# Patient Record
Sex: MALE | Race: WHITE
[De-identification: names, ages, dates, MRNs, and addresses within clinical notes are randomized per-mention and may not be internally consistent; named-entity substitution may affect disease eponyms.]

---

## 2018-02-01 ENCOUNTER — HOSPITAL ENCOUNTER (OUTPATIENT)
Dept: HOSPITAL 92 - ERS | Age: 83
Setting detail: OBSERVATION
LOS: 2 days | Discharge: HOME | End: 2018-02-03
Attending: INTERNAL MEDICINE | Admitting: INTERNAL MEDICINE
Payer: MEDICARE

## 2018-02-01 VITALS — BODY MASS INDEX: 24.1 KG/M2

## 2018-02-01 DIAGNOSIS — Z90.5: ICD-10-CM

## 2018-02-01 DIAGNOSIS — Z66: ICD-10-CM

## 2018-02-01 DIAGNOSIS — H40.9: ICD-10-CM

## 2018-02-01 DIAGNOSIS — Z86.73: ICD-10-CM

## 2018-02-01 DIAGNOSIS — D64.9: ICD-10-CM

## 2018-02-01 DIAGNOSIS — I65.29: ICD-10-CM

## 2018-02-01 DIAGNOSIS — I25.10: ICD-10-CM

## 2018-02-01 DIAGNOSIS — Z79.899: ICD-10-CM

## 2018-02-01 DIAGNOSIS — Z98.61: ICD-10-CM

## 2018-02-01 DIAGNOSIS — I24.8: ICD-10-CM

## 2018-02-01 DIAGNOSIS — Z79.02: ICD-10-CM

## 2018-02-01 DIAGNOSIS — R55: Primary | ICD-10-CM

## 2018-02-01 DIAGNOSIS — S01.01XA: ICD-10-CM

## 2018-02-01 DIAGNOSIS — M81.0: ICD-10-CM

## 2018-02-01 DIAGNOSIS — Z88.0: ICD-10-CM

## 2018-02-01 DIAGNOSIS — I10: ICD-10-CM

## 2018-02-01 DIAGNOSIS — R53.1: ICD-10-CM

## 2018-02-01 LAB
ALBUMIN SERPL BCG-MCNC: 3.8 G/DL (ref 3.4–4.8)
ALP SERPL-CCNC: 69 U/L (ref 40–150)
ALT SERPL W P-5'-P-CCNC: 22 U/L (ref 8–55)
ANION GAP SERPL CALC-SCNC: 15 MMOL/L (ref 10–20)
AST SERPL-CCNC: 32 U/L (ref 5–34)
BASOPHILS # BLD AUTO: 0 THOU/UL (ref 0–0.2)
BASOPHILS NFR BLD AUTO: 0.4 % (ref 0–1)
BILIRUB SERPL-MCNC: 1 MG/DL (ref 0.2–1.2)
BUN SERPL-MCNC: 19 MG/DL (ref 8.4–25.7)
CALCIUM SERPL-MCNC: 9.8 MG/DL (ref 7.8–10.44)
CHLORIDE SERPL-SCNC: 106 MMOL/L (ref 98–107)
CK MB SERPL-MCNC: 4.7 NG/ML (ref 0–6.6)
CO2 SERPL-SCNC: 25 MMOL/L (ref 23–31)
CREAT CL PREDICTED SERPL C-G-VRATE: 0 ML/MIN (ref 70–130)
EOSINOPHIL # BLD AUTO: 0 THOU/UL (ref 0–0.7)
EOSINOPHIL NFR BLD AUTO: 0.5 % (ref 0–10)
GLOBULIN SER CALC-MCNC: 3.1 G/DL (ref 2.4–3.5)
GLUCOSE SERPL-MCNC: 102 MG/DL (ref 83–110)
HGB BLD-MCNC: 13.6 G/DL (ref 14–18)
LYMPHOCYTES # BLD: 1.5 THOU/UL (ref 1.2–3.4)
LYMPHOCYTES NFR BLD AUTO: 20.3 % (ref 21–51)
MCH RBC QN AUTO: 34.3 PG (ref 27–31)
MCV RBC AUTO: 103 FL (ref 80–94)
MONOCYTES # BLD AUTO: 0.6 THOU/UL (ref 0.11–0.59)
MONOCYTES NFR BLD AUTO: 8.6 % (ref 0–10)
NEUTROPHILS # BLD AUTO: 5.2 THOU/UL (ref 1.4–6.5)
NEUTROPHILS NFR BLD AUTO: 70.3 % (ref 42–75)
PLATELET # BLD AUTO: 120 THOU/UL (ref 130–400)
POTASSIUM SERPL-SCNC: 4.5 MMOL/L (ref 3.5–5.1)
RBC # BLD AUTO: 3.97 MILL/UL (ref 4.7–6.1)
SODIUM SERPL-SCNC: 141 MMOL/L (ref 136–145)
SP GR UR STRIP: 1.02 (ref 1–1.04)
TROPONIN I SERPL DL<=0.01 NG/ML-MCNC: 0.17 NG/ML (ref ?–0.03)
TROPONIN I SERPL DL<=0.01 NG/ML-MCNC: 0.23 NG/ML (ref ?–0.03)
WBC # BLD AUTO: 7.3 THOU/UL (ref 4.8–10.8)

## 2018-02-01 PROCEDURE — 85025 COMPLETE CBC W/AUTO DIFF WBC: CPT

## 2018-02-01 PROCEDURE — 70498 CT ANGIOGRAPHY NECK: CPT

## 2018-02-01 PROCEDURE — 80053 COMPREHEN METABOLIC PANEL: CPT

## 2018-02-01 PROCEDURE — 99285 EMERGENCY DEPT VISIT HI MDM: CPT

## 2018-02-01 PROCEDURE — G0009 ADMIN PNEUMOCOCCAL VACCINE: HCPCS

## 2018-02-01 PROCEDURE — 81003 URINALYSIS AUTO W/O SCOPE: CPT

## 2018-02-01 PROCEDURE — 90670 PCV13 VACCINE IM: CPT

## 2018-02-01 PROCEDURE — 97139 UNLISTED THERAPEUTIC PX: CPT

## 2018-02-01 PROCEDURE — 90471 IMMUNIZATION ADMIN: CPT

## 2018-02-01 PROCEDURE — 36415 COLL VENOUS BLD VENIPUNCTURE: CPT

## 2018-02-01 PROCEDURE — 97116 GAIT TRAINING THERAPY: CPT

## 2018-02-01 PROCEDURE — 80048 BASIC METABOLIC PNL TOTAL CA: CPT

## 2018-02-01 PROCEDURE — 96360 HYDRATION IV INFUSION INIT: CPT

## 2018-02-01 PROCEDURE — 82553 CREATINE MB FRACTION: CPT

## 2018-02-01 PROCEDURE — 70551 MRI BRAIN STEM W/O DYE: CPT

## 2018-02-01 PROCEDURE — G0378 HOSPITAL OBSERVATION PER HR: HCPCS

## 2018-02-01 PROCEDURE — 93880 EXTRACRANIAL BILAT STUDY: CPT

## 2018-02-01 PROCEDURE — 93005 ELECTROCARDIOGRAM TRACING: CPT

## 2018-02-01 PROCEDURE — 70450 CT HEAD/BRAIN W/O DYE: CPT

## 2018-02-01 PROCEDURE — 84484 ASSAY OF TROPONIN QUANT: CPT

## 2018-02-01 PROCEDURE — 96361 HYDRATE IV INFUSION ADD-ON: CPT

## 2018-02-01 PROCEDURE — 93306 TTE W/DOPPLER COMPLETE: CPT

## 2018-02-01 PROCEDURE — 12002 RPR S/N/AX/GEN/TRNK2.6-7.5CM: CPT

## 2018-02-01 NOTE — CT
HEAD CT WITHOUT CONTRAST:

2/1/18

 

COMPARISON:  

None.

 

HISTORY: 

Fall, closed head injury.

 

TECHNIQUE:  

Serial axial CT imaging at 5 mm intervals obtained from the vertex through the skull base without con
trast. 

 

FINDINGS:  

the imaged paranasal sinuses and mastoid air cells are well aerated. There is atherosclerotic calcifi
cation of the cavernous carotid arteries. There is no displaced calvarial fracture seen. 

 

There is diffuse cerebral volume loss with associated prominence of the CSF containing spaces. There 
is mild periventricular and deep white matter hypodensity suggesting a degree of small vessel disease
. 

 

IMPRESSION:  

No intracranial hemorrhage or displaced calvarial fracture. 

 

POS: [facility]

## 2018-02-02 LAB
ANION GAP SERPL CALC-SCNC: 10 MMOL/L (ref 10–20)
BASOPHILS # BLD AUTO: 0 THOU/UL (ref 0–0.2)
BASOPHILS NFR BLD AUTO: 0.2 % (ref 0–1)
BUN SERPL-MCNC: 16 MG/DL (ref 8.4–25.7)
CALCIUM SERPL-MCNC: 9.9 MG/DL (ref 7.8–10.44)
CHLORIDE SERPL-SCNC: 108 MMOL/L (ref 98–107)
CO2 SERPL-SCNC: 25 MMOL/L (ref 23–31)
CREAT CL PREDICTED SERPL C-G-VRATE: 57 ML/MIN (ref 70–130)
EOSINOPHIL # BLD AUTO: 0.1 THOU/UL (ref 0–0.7)
EOSINOPHIL NFR BLD AUTO: 1.9 % (ref 0–10)
GLUCOSE SERPL-MCNC: 121 MG/DL (ref 83–110)
HGB BLD-MCNC: 12.2 G/DL (ref 14–18)
LYMPHOCYTES # BLD: 1.4 THOU/UL (ref 1.2–3.4)
LYMPHOCYTES NFR BLD AUTO: 24.7 % (ref 21–51)
MCH RBC QN AUTO: 34.5 PG (ref 27–31)
MCV RBC AUTO: 102 FL (ref 80–94)
MONOCYTES # BLD AUTO: 0.6 THOU/UL (ref 0.11–0.59)
MONOCYTES NFR BLD AUTO: 10.1 % (ref 0–10)
NEUTROPHILS # BLD AUTO: 3.6 THOU/UL (ref 1.4–6.5)
NEUTROPHILS NFR BLD AUTO: 63 % (ref 42–75)
PLATELET # BLD AUTO: 115 THOU/UL (ref 130–400)
POTASSIUM SERPL-SCNC: 3.8 MMOL/L (ref 3.5–5.1)
RBC # BLD AUTO: 3.53 MILL/UL (ref 4.7–6.1)
SODIUM SERPL-SCNC: 139 MMOL/L (ref 136–145)
TROPONIN I SERPL DL<=0.01 NG/ML-MCNC: 0.22 NG/ML (ref ?–0.03)
WBC # BLD AUTO: 5.7 THOU/UL (ref 4.8–10.8)

## 2018-02-02 NOTE — PDOC.PN
- Subjective


Encounter Start Date: 02/02/18


Encounter Start Time: 17:30


Subjective: f/u for ?syncope, weakness. States he is feeling weak but improved 

since


-: admit. Some HA. No unilateral weakness or speech disturbance. 





- Objective


Resuscitation Status: 


 











Resuscitation Status           DNR:Do Not Resuscitate














MAR Reviewed: Yes


Vital Signs & Weight: 


 Vital Signs (12 hours)











  Temp Pulse Pulse Pulse Pulse Pulse Resp


 


 02/02/18 16:23  98.9 F  68      15


 


 02/02/18 11:26  98.2 F  78      18


 


 02/02/18 09:30    77  83  86  76 


 


 02/02/18 08:00  98.9 F  73      20














  BP BP BP BP BP Pulse Ox


 


 02/02/18 16:23      140/75  98


 


 02/02/18 11:26      147/72 H  95


 


 02/02/18 09:30  121/66  144/82 H  146/75 H  120/75  


 


 02/02/18 08:00      124/69  94 L








 Weight











Weight                         158 lb 11.725 oz














I&O: 


 











 02/01/18 02/02/18 02/03/18





 06:59 06:59 06:59


 


Intake Total  219 720


 


Output Total  400 


 


Balance  -181 720











Result Diagrams: 


 02/02/18 02:02





 02/02/18 02:02


Radiology Reviewed by me: Yes (CT brain - neg; Carotid sono - pending)


EKG Reviewed by me: Yes (Tele - SR)





Phys Exam





- Physical Examination


Constitutional: NAD


HEENT: PERRLA, oral pharynx no lesions


Neck: no JVD, supple


Respiratory: no wheezing, clear to auscultation bilateral


Cardiovascular: RRR


Gastrointestinal: soft, non-tender, no distention, positive bowel sounds


Musculoskeletal: no edema, pulses present


Neurological: normal sensation, moves all 4 limbs


Psychiatric: A&O x 3


Skin: normal turgor, cap refill <2 seconds





Dx/Plan


(1) Syncope and collapse


Code(s): R55 - SYNCOPE AND COLLAPSE   Status: Suspected   Comment: Suspected 

syncope, MRI brain pending, Carotid sono pending   





(2) General weakness


Code(s): R53.1 - WEAKNESS   Status: Acute   Comment: Suspect due to #1, PT 

evaluation for functional assessment   





(3) Scalp laceration


Status: Acute   Comment: Local care   





(4) HTN (hypertension)


Code(s): I10 - ESSENTIAL (PRIMARY) HYPERTENSION   Status: Chronic   


Qualifiers: 


   Hypertension type: essential hypertension   Qualified Code(s): I10 - 

Essential (primary) hypertension   


Comment: Resume home BP regimen, serial monitoring   





(5) Hx of transient ischemic attack (TIA)


Status: Chronic   Comment: Continue Plavix 75mg daily   





(6) Demand ischemia


Code(s): I24.8 - OTHER FORMS OF ACUTE ISCHEMIC HEART DISEASE   Status: Acute   

Comment: Suspect due to #1, Echo pending, continue Plavix and Statin   





- Plan


plan discussed w/ family, PT/OT, , out of bed/ambulate, DVT 

proph w/SCDs


Stable currently


-: MRI brain pending


-: Check Carotid sono results


-: Resume home BP regimen


-: Saline Lock IVF





* .

## 2018-02-02 NOTE — CON
DATE OF CONSULTATION:  02/02/2018

 

REASON FOR CONSULTATION:  Syncope and fall.

 

REFERRING PHYSICIAN:  Bharat Cunningham M.D.

 

HISTORY OF PRESENT ILLNESS:  Mr. Hirsch is a pleasant 86-year-old  male
, who has been consulted for evaluation of syncopal spell and recent fall.  
History is obtained from daughter who was present at bedside.  Daughter reports 
that the patient was at home by himself.  He usually wakes up in the morning 
and eats his breakfast and then goes and lays down on his recliner to take a 
nap.  He was by himself at that time, in the evening time when her boyfriend 
went to go check on him, he found him on the floor, not able to get up by 
himself.  He also noted the patient had fell on the floor and had laceration to 
the back of the head.  He was awake and alert and oriented at that time, but 
weak to stand up.  She reports that he has had histories of falling down and 
near syncope.  He was admitted to [facility] in [place], [place] in 
04/2017, at which time, MRI brain and MRA head and neck were done, which had 
shown prior areas of infarction as well as 50% stenosis of the left ICA.  He 
has had history of another 2 more events 3 to 4 years ago, which were thought 
to be secondary to dehydration.  Today, he reports of feeling better.  He 
denies any headache, chest pain, palpitation, nausea, vomiting, numbness, 
tingling or weakness.  He denies any speech difficulty, swallowing, difficulty 
or difficulty with bowel or bladder.

 

PAST MEDICAL HISTORY:  Significant for hypertension, coronary artery disease, 
history of stroke, and osteoporosis.

 

PAST SURGICAL HISTORY:  Significant for nephrectomy and cardiac stent placement.

 

SOCIAL HISTORY:  Denies smoking, alcohol use, or illicit drug use.

 

FAMILY HISTORY:  Noncontributory.

 

CURRENT MEDICATIONS:  Please review MAR.

 

ALLERGIES:  Include PENICILLIN.

 

REVIEW OF SYSTEMS:  As mentioned in the HPI, otherwise negative.

 

PHYSICAL EXAMINATION:

VITAL SIGNS:  Blood pressure of 140/75, pulse of 68, temperature of 98.9, 
respirations of 15, O2 sats of 98% on room air.

GENERAL:  Well-developed, well-nourished  male, in no apparent 
distress.

RESPIRATORY:  Clear to auscultation bilaterally.

CARDIOVASCULAR:  Regular rate and rhythm.

NEUROLOGIC:  Mental status:  The patient is awake, alert, oriented x3.  Speech 
and language:  Fluent speech.  Cranial nerves:  Pupils are 3 mm and reactive.  
Visual fields are intact.  External muscles are intact.  No nystagmus is noted.
  Face is symmetric.  Tongue and uvula are midline.  Motor exam showed normal 
tone and bulk with 5/5 strength in both upper and lower extremities.  Sensory:  
Sensation is intact and symmetric.  Deep tendon reflexes 2+ reflexes in both 
upper and lower extremities.  Babinski:  Plantar responses flexion bilaterally.
  Coordination is intact to finger-nose-finger and finger tapping bilaterally.  
Romberg is negative.  His gait is somewhat slow wide-based.

 

LABORATORY DATA:  Reviewed, which included CBC, CMP, troponin, urinalysis, 
which is significant for hemoglobin of 12.2, hematocrit of 36.2, platelet count 
of 115, troponin is 0.222, otherwise unremarkable.

 

IMAGING STUDIES:  CT head without contrast was reviewed which showed no acute 
intracranial abnormality.

 

IMPRESSION AND PLAN:  Syncope.  



Mr. Hirsch is a pleasant 86-year-old  male, who presented with an 
episode of passing out and fall followed by generalized weakness and inability 
to stand up for several hours.  Based on the description of the symptoms, this 
is likely vasovagal in the origin.  He has no prior history of seizure disorder 
and thus I do not think this is suggestive of a seizure episode.  I would 
recommend obtaining MRI brain without contrast to rule out an acute ischemic 
event.  If that is normal, the patient may benefit from outpatient therapy.  No 
further neurologic workup needed from my standpoint.

 

Thank you for consultation.

 

GREER

## 2018-02-02 NOTE — CON
DATE OF CONSULTATION:  02/02/2018

 

REASON FOR CONSULTATION:  Syncope.

 

HISTORY OF PRESENT ILLNESS:  Mr. Hirsch is a very pleasant 86-year-old gentleman with previous history
 of underlying coronary artery disease, status post stent placement x5.  This was all performed in [facility].  Last stent was placed several years ago.  He recently presented with syncope.  The history is 
somewhat vague.  He lives alone.  He states he was in his chair and slept over and he was on the grou
nd.  He does not remember having a syncopal episode.  He states he may have fallen over, but is unsur
e.  His daughter states he was on the floor for several hours.  He denies chest pain or significant c
hange in shortness of breath.  His EKG does not show acute changes.

 

PAST MEDICAL HISTORY:  As above including CVA, osteoporosis, nephrectomy.

 

SOCIAL HISTORY:  No current tobacco or alcohol use.

 

HOME MEDICATIONS:  Include Lasix, metoprolol, potassium, Zocor, Travatan, dorzolamide and Plavix.

 

ALLERGIES:  PENICILLIN.

 

REVIEW OF SYSTEMS:  Ten-point review of systems is reviewed and as above, otherwise negative.

 

PHYSICAL EXAMINATION:

GENERAL:  Patient is a pleasant male who is in no acute distress.  The patient appears his stated age
.

VITAL SIGNS:  Blood pressure 140/75, pulse 60, temperature 98.9.

NEUROLOGIC:  The patient is alert and oriented times 3 with no focal neurologic deficits.

HEENT:  Sclerae without icterus.  Mouth has moist mucous membranes with normal pallor.

NECK:  No JVD.  Carotid upstroke brisk.  No bruits bilaterally.

LUNGS:  Clear to auscultation with unlabored respirations.

BACK:  No scoliosis or kyphosis.

CARDIAC:  Regular rate and rhythm with normal S1 and S2.  No S3 or S4 noted.  No significant rubs, mu
rmurs, thrills, or gallops noted throughout the precordium.  PMI is not displaced.  There is no jose
ternal heave.

ABDOMEN:  Soft, nontender, nondistended.  No peritoneal signs present.  No hepatosplenomegaly.  No ab
normal striae.

EXTREMITIES:  2+ femoral and 2+ dorsalis pedis pulses.  No cyanosis, clubbing, or edema.

SKIN:  No gross abnormalities. 

 

PERTINENT LABORATORY DATA:  Hemoglobin 12.2.  Peak troponin 0.222, creatinine 0.94.  EKG:  Normal sin
us rhythm with nonspecific ST-T wave changes.

 

IMPRESSION:

1.  Syncope.

2.  Borderline troponin.

3.  Coronary artery disease.

 

RECOMMENDATIONS:  Etiology to his syncopal episode is unknown.  He denies chest pain, pressure, or sh
ortness of breath.  This may have been likely due to a dysrhythmia.  We would recommend an echo with 
Doppler.  We would also recommend continued monitoring overnight on telemetry monitoring.  If his EF 
is within normal limits and his tele is negative for dysrhythmias, it would be okay from my standpoin
t to discharge home with close outpatient followup.  I did discuss no driving for 6 months given that
 reason for his current episode is unknown.

## 2018-02-02 NOTE — ULT
BILATERAL CAROTID DUPLEX ULTRASOUND:

2/2/18

 

HISTORY: 

Atherosclerotic vascular disease, Coronary artery disease. 

 

TECHNIQUE:  

Gray scale, color flow and spectral doppler imaging of the extracranial carotid artery system was per
formed bilaterally.

 

The peak systolic velocity in the right ICA measures 76 cm/s with an end diastolic velocity of 15 cm/
s and systolic ratio of 0.71. 

 

The peak systolic velocity in the left ICA measures 136 cm/s with an end diastolic velocity of 31 cm/
s and systolic ratio of 2.88. 

 

Flow in both vertebral arteries remains antegrade.

 

IMPRESSION:  

Moderate (50-69%) stenosis involving the left ICA. 

 

POS: [facility]

## 2018-02-03 VITALS — SYSTOLIC BLOOD PRESSURE: 120 MMHG | DIASTOLIC BLOOD PRESSURE: 70 MMHG

## 2018-02-03 VITALS — TEMPERATURE: 98.1 F

## 2018-02-03 NOTE — PDOC.PN
- Subjective


Encounter Start Date: 02/03/18


Encounter Start Time: 07:10


-: old records requested/rev





Patient seen and examined. No new complaints. No overnight events





- Objective


Resuscitation Status: 


 











Resuscitation Status           DNR:Do Not Resuscitate














MAR Reviewed: Yes


Vital Signs & Weight: 


 Vital Signs (12 hours)











  Temp Pulse Resp BP Pulse Ox


 


 02/03/18 11:39  98.1 F  79  20  192/96 H  90 L


 


 02/03/18 08:57  98.6 F  66  16  


 


 02/03/18 07:54  98.6 F  66  16  133/73  96


 


 02/03/18 04:00  97.7 F  72  16  158/80 H  95








 Weight











Weight                         154 lb 8 oz














I&O: 


 











 02/02/18 02/03/18 02/04/18





 06:59 06:59 06:59


 


Intake Total 219 2130 


 


Output Total 400  


 


Balance -181 2130 











Result Diagrams: 


 02/02/18 02:02





 02/02/18 02:02


Radiology Reviewed by me: Yes (MRI, CT angiography)


EKG Reviewed by me: Yes





Phys Exam





- Physical Examination


Constitutional: NAD


HEENT: PERRLA, moist MMs, sclera anicteric


Neck: no JVD, supple


Respiratory: no wheezing, no rales, no rhonchi


Cardiovascular: RRR, no significant murmur, no rub


Gastrointestinal: soft, non-tender, no distention, positive bowel sounds


Musculoskeletal: no edema, pulses present


Neurological: non-focal, normal sensation


Lymphatic: no nodes


Psychiatric: normal affect, A&O x 3


Skin: no rash, normal turgor





Dx/Plan


(1) Vertebrobasilar insufficiency


Code(s): G45.0 - VERTEBRO-BASILAR ARTERY SYNDROME   Status: Acute   





(2) Carotid stenosis, left


Code(s): I65.22 - OCCLUSION AND STENOSIS OF LEFT CAROTID ARTERY   Status: Acute

   





(3) Demand ischemia


Code(s): I24.8 - OTHER FORMS OF ACUTE ISCHEMIC HEART DISEASE   Status: Acute   

Comment:     





(4) General weakness


Code(s): R53.1 - WEAKNESS   Status: Acute   Comment:     





(5) Scalp laceration


Status: Acute   Comment: Local care   





(6) Glaucoma


Code(s): H40.9 - UNSPECIFIED GLAUCOMA   Status: Chronic   





(7) HTN (hypertension)


Code(s): I10 - ESSENTIAL (PRIMARY) HYPERTENSION   Status: Chronic   


Qualifiers: 


   Hypertension type: essential hypertension   Qualified Code(s): I10 - 

Essential (primary) hypertension   


Comment: Resume home BP regimen, serial monitoring   





(8) Hx of transient ischemic attack (TIA)


Status: Chronic   Comment: Continue Plavix 75mg daily   





(9) Macrocytic anemia


Code(s): D53.9 - NUTRITIONAL ANEMIA, UNSPECIFIED   Status: Chronic   





(10) Syncope and collapse


Code(s): R55 - SYNCOPE AND COLLAPSE   Status: Suspected   Comment:     





- Plan


cont current plan of care, plan discussed w/ family





* all test result discussed with daughter


* medication reviewed as below


* symptomatic treatment


* pt will discharge to assisted living facility


* see discharge laura.


* add folic acid and vitamin B12








Review of Systems





- Review of Systems


ENT: negative: Ear Pain, Ear Discharge, Nose Pain, Nose Discharge, Nose 

Congestion, Mouth Pain, Mouth Swelling, Throat Pain, Throat Swelling, Other


Respiratory: negative: Cough, Dry, Shortness of Breath, Hemoptysis, SOB with 

Excertion, Pleuritic Pain, Sputum, Wheezing


Cardiovascular: negative: chest pain, palpitations, orthopnea, paroxysmal 

nocturnal dyspnea, edema, light headedness, other


Gastrointestinal: negative: Nausea, Vomiting, Abdominal Pain, Diarrhea, 

Constipation, Melena, Hematochezia, Other


Genitourinary: negative: Dysuria, Frequency, Incontinence, Hematuria, Retention

, Other


Musculoskeletal: negative: Neck Pain, Shoulder Pain, Arm Pain, Back Pain, Hand 

Pain, Leg Pain, Foot Pain, Other


Skin: negative: Rash, Lesions, Amarjit, Bruising, Other





- Medications/Allergies


Allergies/Adverse Reactions: 


 Allergies











Allergy/AdvReac Type Severity Reaction Status Date / Time


 


Penicillins Allergy   Verified 02/02/18 00:59











Medications: 


 Current Medications





Acetaminophen (Tylenol)  650 mg PO Q4H PRN


   PRN Reason: Headache/Fever or Pain


Hydrocodone Bitart/Acetaminophen (Norco 5/325)  1 tab PO Q4H PRN


   PRN Reason: Moderate Pain (4-6)


Al Hydroxide/Mg Hydroxide (Maalox)  15 ml PO Q4H PRN


   PRN Reason: Heartburn  or Indigestion


Artificial Tears (Tears Naturale)  0 drop EA EYE PRN PRN


   PRN Reason: Dry Eyes


Atorvastatin Calcium (Lipitor)  10 mg PO HS [facility]


   Last Admin: 02/02/18 20:49 Dose:  Not Given


Brimonidine Tartrate (Alphagan 0.2% Ophth Soln)  1 drop EA EYE DAILY [facility]


   Last Admin: 02/03/18 08:57 Dose:  1 drop


Clopidogrel Bisulfate (Plavix)  75 mg PO DAILY [facility]


   Last Admin: 02/03/18 08:57 Dose:  75 mg


Cyanocobalamin (Vitamin B-12)  1,000 mcg PO DAILY [facility]


Dorzolamide/Timolol (Cosopt 2-0.5% Ophth Soln)  1 drop EA EYE DAILY [facility]


   Last Admin: 02/03/18 08:57 Dose:  1 drop


Enoxaparin Sodium (Lovenox)  40 mg SC 0900 [facility]


   Last Admin: 02/03/18 08:57 Dose:  Not Given


Famotidine (Pepcid)  20 mg PO BID [facility]


Folic Acid (Folvite)  1 mg PO DAILY [facility]


Guaifenesin (Robitussin Sf)  200 mg PO Q4H PRN


   PRN Reason: Cough


Hydralazine HCl (Apresoline)  10 mg SLOW IVP Q4H PRN


   PRN Reason: Systolic BP > 180


Loperamide HCl (Imodium)  2 mg PO PRN PRN


   PRN Reason: Diarrhea/Loose Stools


Loratadine (Claritin)  10 mg PO DAILYPRN PRN


   PRN Reason: Sinus Symptoms


Magnesium Hydroxide (Milk Of Magnesium)  30 ml PO DAILYPRN PRN


   PRN Reason: Constipation


Metoprolol Succinate (Toprol Xl)  25 mg PO DAILY [facility]


   Last Admin: 02/03/18 08:57 Dose:  25 mg


Mineral Oil/White Petrolatum (Eucerin Cream)  0 gm TOP BIDPRN PRN


   PRN Reason: Dry Skin


Ondansetron HCl (Zofran Odt)  4 mg PO Q6H PRN


   PRN Reason: Nausea/Vomiting


Ondansetron HCl (Zofran)  4 mg IVP Q6H PRN


   PRN Reason: Nausea/Vomiting


Phenol (Chloraseptic Spray 180 Ml Bot)  0 ml PO PRN PRN


   PRN Reason: Sore Throat


Senna (Senokot)  2 tab PO HSPRN PRN


   PRN Reason: Constipation


Sodium Chloride (Ocean Nasal Spray 0.65%)  0 ml EA NARE QIDPRN PRN


   PRN Reason: Nasal Congestion


Temazepam (Restoril)  15 mg PO HSPRN PRN


   PRN Reason: Insomnia


Terazosin HCl (Hytrin)  5 mg PO HS [facility]


   Last Admin: 02/02/18 20:49 Dose:  Not Given

## 2018-02-03 NOTE — DIS
PRIMARY CARE PHYSICIAN:  Elan Glover M.D.

 

DATE OF ADMISSION:  02/01/2018

 

DATE OF DISCHARGE:  02/03/2018

 

DISCHARGE DISPOSITION:  Assisted Living Facility.

 

PRIMARY DISCHARGE DIAGNOSES:

1.  Syncope/collapse, suspected.

2.  Scalp laceration.

3.  Demand ischemia.

 

SECONDARY DISCHARGE DIAGNOSES:  Macrocytic anemia, history of transient ischemic attack, hypertension
, glaucoma, generalized weakness, carotid stenoses.

 

PRIMARY PROCEDURE/OPERATION:  None.

 

RADIOLOGICAL INVESTIGATION:  CT brain showed no acute process.  Carotid Doppler showed left carotid s
tenosis.  CT angiography showed vertebral artery disease, carotid stenosis.  MRI brain did not show a
ny stroke.

 

SIGNIFICANT LABORATORY DATA:  WBC 5.7, hemoglobin 12.2, , platelet 115.  Sodium 141, potassium
 4.5, chloride 106, carbon dioxide 25, anion gap 15, BUN 19, creatinine 1.0, glucose 102, calcium 9.8
.  LFTs normal.  Cardiac enzymes 0.173.  Urinalysis normal.

 

DISCHARGE MEDICATIONS:  Alphagan ophthalmic drop each eye daily, Plavix 75 mg p.o. daily, vitamin B12
 at 1000 mcg p.o. daily, dorzolamide/timolol ophthalmic drop each eye daily, folic acid 1 mg p.o. leandra
ly, Lasix 20 mg p.o. daily, Toprol-XL 25 mg p.o. daily, potassium chloride 10 mEq p.o. daily, Zocor 2
0 mg p.o. at bedtime, and terazosin 5 mg p.o. at bedtime.

 

CONTRAINDICATIONS:  None.

 

CODE STATUS:  DNR.

 

INPATIENT CONSULTANTS:  Dr. Marks was consulted while in hospital.  Dr. Jeff Huggins was consulted 
while in hospital.

 

TEST RESULTS PENDING ON DISCHARGE:  None.

 

ALLERGIES:  PENICILLIN.

 

DISCHARGE PLAN:  Post hospital, the patient will follow up with primary care physician.

 

HOSPITAL COURSE:  An 86-year-old male, who currently lives at independent living apartment, where he 
was resting in his recliner and subsequently he was found that he has scalp laceration.  Initially CT
 brain did not show any acute process in the emergency room.  He was brought to the ER at that time d
iagnosis was uncertain.  He had elevated troponin and that is why we consulted Cardiology and Cardiol
noemi recommended that this patient might have a vasovagal syncope and echocardiography was obtained, o
fficial report is pending.

 

Neurology was also consulted and they are not thinking that this patient has any seizure or any neuro
logical problem, but they are also thinking that this could be related with orthostatic hypotension, 
but MRI was obtained and MRI completely ruled out stroke.  This patient had carotid Doppler, which sh
owed left carotid stenosis and after that we did a CT angiography and that did show vertebral artery 
disease on both sides as well as carotid stenosis on both sides, but this patient will need outpatien
t follow up with cardiovascular surgeon for further evaluation if needed.  At this point, during this
 admission, the patient does not need any intervention.  We advised the patient's daughter to upgrade
 him from independent living to assisted living facility.  The patient will not qualify for rehab bec
ause he did very well while in hospital and PT discharge him to a walking program, but this patient w
ill need outpatient rehabilitation versus assisted living facility.  The patient's daughter also okay
 to take him to assisted living facility.

 

The patient was seen and examined at bedside today.  Plan of care discussed with the patient's daught
er, the patient will be discharged later on today after the echo result.

## 2018-02-03 NOTE — MRI
MRI BRAIN NONCONTRAST:

 

HISTORY: 

Fall.  Altered mental status.  

 

FINDINGS: 

There is no evidence of acute intracranial hemorrhage or infarct.  Diffuse cortical atrophy and chron
ic ischemic small-vessel disease are apparent with T2 shine-through in the periventricular white jef
er on the diffusion weighted images.  There is no mass effect or shift of midline structures.

 

IMPRESSION: 

No acute intracranial abnormalities are demonstrated.

 

POS: [facility]

## 2018-02-03 NOTE — CT
CTA ANGIOGRAM NECK INCLUDING 3D RENDERING:

 

HISTORY: 

An 86-year-old male with left carotid artery stenosis.

 

FINDINGS: 

There are extensive interstitial changes within both right and left visualized lungs with some associ
ated honeycombing and evidence for chronic nonspecific interstitial lung disease.  There is mild to m
oderate stenosis at the origin of the left vertebral artery.  There is a large calcified plaque with 
a very high-grade near occlusion of the origin of the right vertebral artery.  There is a generalized
 narrowing of the right vertebral artery with a dominant left vertebral artery.  There is calcified p
laque at the right vertebral artery-PICA junction with some mild stenosis at this level.  

 

Using NASCET criteria, left proximal internal carotid artery stenosis approximates 60%.  There is les
s than 50% stenosis of the proximal right ICA.  The visualized soft tissues of the neck appear unrema
rkable.  There is generalized cervical spine spondylosis.  

 

IMPRESSION: 

1.  Generalized carotid and vertebral artery calcific vascular disease.  There is a high-grade stenos
is of the origin of the right vertebral artery and moderate stenosis of the origin of the left verteb
ral artery.

2.  Approximately 60% stenosis of the proximal left internal carotid artery and less than 50% stenosi
s of the proximal right internal carotid artery.

3.  Smaller right vertebral artery with a more dominant left vertebral artery.  Prominent calcific pl
aque involving the right and left intracranial carotid arteries in the region of the siphon.  

 

POS: [facility]

## 2018-02-08 ENCOUNTER — HOSPITAL ENCOUNTER (EMERGENCY)
Dept: HOSPITAL 92 - ERS | Age: 83
Discharge: HOME | End: 2018-02-08
Payer: MEDICARE

## 2018-02-08 DIAGNOSIS — K21.9: Primary | ICD-10-CM

## 2018-02-08 DIAGNOSIS — R06.00: ICD-10-CM

## 2018-02-08 DIAGNOSIS — M81.0: ICD-10-CM

## 2018-02-08 DIAGNOSIS — I25.2: ICD-10-CM

## 2018-02-08 DIAGNOSIS — Z86.73: ICD-10-CM

## 2018-02-08 LAB
ALBUMIN SERPL BCG-MCNC: 3.6 G/DL (ref 3.4–4.8)
ALP SERPL-CCNC: 63 U/L (ref 40–150)
ALT SERPL W P-5'-P-CCNC: 21 U/L (ref 8–55)
ANION GAP SERPL CALC-SCNC: 12 MMOL/L (ref 10–20)
AST SERPL-CCNC: 25 U/L (ref 5–34)
BASOPHILS # BLD AUTO: 0 THOU/UL (ref 0–0.2)
BASOPHILS NFR BLD AUTO: 0.9 % (ref 0–1)
BILIRUB SERPL-MCNC: 0.6 MG/DL (ref 0.2–1.2)
BUN SERPL-MCNC: 17 MG/DL (ref 8.4–25.7)
CALCIUM SERPL-MCNC: 9.8 MG/DL (ref 7.8–10.44)
CHLORIDE SERPL-SCNC: 106 MMOL/L (ref 98–107)
CK MB SERPL-MCNC: 1 NG/ML (ref 0–6.6)
CO2 SERPL-SCNC: 26 MMOL/L (ref 23–31)
CREAT CL PREDICTED SERPL C-G-VRATE: 0 ML/MIN (ref 70–130)
EOSINOPHIL # BLD AUTO: 0.2 THOU/UL (ref 0–0.7)
EOSINOPHIL NFR BLD AUTO: 2.9 % (ref 0–10)
GLOBULIN SER CALC-MCNC: 3.2 G/DL (ref 2.4–3.5)
GLUCOSE SERPL-MCNC: 105 MG/DL (ref 83–110)
HGB BLD-MCNC: 13.1 G/DL (ref 14–18)
LYMPHOCYTES # BLD: 1.7 THOU/UL (ref 1.2–3.4)
LYMPHOCYTES NFR BLD AUTO: 31.2 % (ref 21–51)
MCH RBC QN AUTO: 35 PG (ref 27–31)
MCV RBC AUTO: 104 FL (ref 80–94)
MONOCYTES # BLD AUTO: 0.6 THOU/UL (ref 0.11–0.59)
MONOCYTES NFR BLD AUTO: 10.2 % (ref 0–10)
NEUTROPHILS # BLD AUTO: 3 THOU/UL (ref 1.4–6.5)
NEUTROPHILS NFR BLD AUTO: 54.9 % (ref 42–75)
PLATELET # BLD AUTO: 135 THOU/UL (ref 130–400)
POTASSIUM SERPL-SCNC: 4.4 MMOL/L (ref 3.5–5.1)
RBC # BLD AUTO: 3.74 MILL/UL (ref 4.7–6.1)
SODIUM SERPL-SCNC: 140 MMOL/L (ref 136–145)
TROPONIN I SERPL DL<=0.01 NG/ML-MCNC: 0.03 NG/ML (ref ?–0.03)
WBC # BLD AUTO: 5.5 THOU/UL (ref 4.8–10.8)

## 2018-02-08 PROCEDURE — 84484 ASSAY OF TROPONIN QUANT: CPT

## 2018-02-08 PROCEDURE — 93005 ELECTROCARDIOGRAM TRACING: CPT

## 2018-02-08 PROCEDURE — 71045 X-RAY EXAM CHEST 1 VIEW: CPT

## 2018-02-08 PROCEDURE — 82553 CREATINE MB FRACTION: CPT

## 2018-02-08 PROCEDURE — 80053 COMPREHEN METABOLIC PANEL: CPT

## 2018-02-08 PROCEDURE — 83880 ASSAY OF NATRIURETIC PEPTIDE: CPT

## 2018-02-08 PROCEDURE — 36415 COLL VENOUS BLD VENIPUNCTURE: CPT

## 2018-02-08 PROCEDURE — 85025 COMPLETE CBC W/AUTO DIFF WBC: CPT

## 2018-02-08 NOTE — RAD
PORTABLE CHEST:

2/8/18

 

HISTORY: 

Shortness of breath. 

 

No comparison chest films.

 

CT angio of the neck shows diffuse chronic appearing interstitial changes throughout the visualized u
pper lung fields. 

 

Portable chest shows diffuse interstitial thickening bilaterally. There is some scattered reticular n
odular opacities which appear chronic. No focal consolidation. Heart size upper normal. Aortic calcif
ication is noted. 

 

IMPRESSION:  

Chronic appearing lung parenchymal changes with interstitial thickening and reticulonodular opacities
. 

 

POS: [facility]

## 2018-09-23 ENCOUNTER — HOSPITAL ENCOUNTER (EMERGENCY)
Dept: HOSPITAL 92 - ERS | Age: 83
Discharge: HOME | End: 2018-09-23
Payer: MEDICARE

## 2018-09-23 DIAGNOSIS — N40.0: ICD-10-CM

## 2018-09-23 DIAGNOSIS — I25.2: ICD-10-CM

## 2018-09-23 DIAGNOSIS — Z79.899: ICD-10-CM

## 2018-09-23 DIAGNOSIS — M81.0: ICD-10-CM

## 2018-09-23 DIAGNOSIS — R55: Primary | ICD-10-CM
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
## 2018-09-23 DIAGNOSIS — I25.10: ICD-10-CM

## 2018-09-23 LAB
ALBUMIN SERPL BCG-MCNC: 3.5 G/DL (ref 3.4–4.8)
ALP SERPL-CCNC: 61 U/L (ref 40–150)
ALT SERPL W P-5'-P-CCNC: 17 U/L (ref 8–55)
ANION GAP SERPL CALC-SCNC: 12 MMOL/L (ref 10–20)
AST SERPL-CCNC: 23 U/L (ref 5–34)
BASOPHILS # BLD AUTO: 0 THOU/UL (ref 0–0.2)
BASOPHILS NFR BLD AUTO: 0.2 % (ref 0–1)
BILIRUB SERPL-MCNC: 0.8 MG/DL (ref 0.2–1.2)
BUN SERPL-MCNC: 17 MG/DL (ref 8.4–25.7)
CALCIUM SERPL-MCNC: 9.5 MG/DL (ref 7.8–10.44)
CHLORIDE SERPL-SCNC: 107 MMOL/L (ref 98–107)
CK MB SERPL-MCNC: 1.5 NG/ML (ref 0–6.6)
CK SERPL-CCNC: 52 U/L (ref 30–200)
CO2 SERPL-SCNC: 24 MMOL/L (ref 23–31)
CREAT CL PREDICTED SERPL C-G-VRATE: 0 ML/MIN (ref 70–130)
EOSINOPHIL # BLD AUTO: 0.1 THOU/UL (ref 0–0.7)
EOSINOPHIL NFR BLD AUTO: 1.5 % (ref 0–10)
GLOBULIN SER CALC-MCNC: 3.4 G/DL (ref 2.4–3.5)
GLUCOSE SERPL-MCNC: 145 MG/DL (ref 83–110)
HGB BLD-MCNC: 14.2 G/DL (ref 14–18)
LYMPHOCYTES # BLD: 0.6 THOU/UL (ref 1.2–3.4)
LYMPHOCYTES NFR BLD AUTO: 9.1 % (ref 21–51)
MCH RBC QN AUTO: 33.8 PG (ref 27–31)
MCV RBC AUTO: 102 FL (ref 78–98)
MONOCYTES # BLD AUTO: 0.3 THOU/UL (ref 0.11–0.59)
MONOCYTES NFR BLD AUTO: 5.3 % (ref 0–10)
NEUTROPHILS # BLD AUTO: 5.3 THOU/UL (ref 1.4–6.5)
NEUTROPHILS NFR BLD AUTO: 84 % (ref 42–75)
PLATELET # BLD AUTO: 129 THOU/UL (ref 130–400)
POTASSIUM SERPL-SCNC: 4.5 MMOL/L (ref 3.5–5.1)
RBC # BLD AUTO: 4.19 MILL/UL (ref 4.7–6.1)
SODIUM SERPL-SCNC: 138 MMOL/L (ref 136–145)
TROPONIN I SERPL DL<=0.01 NG/ML-MCNC: (no result) NG/ML (ref ?–0.03)
WBC # BLD AUTO: 6.3 THOU/UL (ref 4.8–10.8)

## 2018-09-23 PROCEDURE — 94760 N-INVAS EAR/PLS OXIMETRY 1: CPT

## 2018-09-23 PROCEDURE — 85025 COMPLETE CBC W/AUTO DIFF WBC: CPT

## 2018-09-23 PROCEDURE — 83880 ASSAY OF NATRIURETIC PEPTIDE: CPT

## 2018-09-23 PROCEDURE — 84484 ASSAY OF TROPONIN QUANT: CPT

## 2018-09-23 PROCEDURE — 71045 X-RAY EXAM CHEST 1 VIEW: CPT

## 2018-09-23 PROCEDURE — 36415 COLL VENOUS BLD VENIPUNCTURE: CPT

## 2018-09-23 PROCEDURE — 93005 ELECTROCARDIOGRAM TRACING: CPT

## 2018-09-23 PROCEDURE — 71275 CT ANGIOGRAPHY CHEST: CPT

## 2018-09-23 PROCEDURE — 70450 CT HEAD/BRAIN W/O DYE: CPT

## 2018-09-23 PROCEDURE — 80053 COMPREHEN METABOLIC PANEL: CPT

## 2018-09-23 PROCEDURE — 82553 CREATINE MB FRACTION: CPT

## 2018-09-23 NOTE — CT
CTA OF THE CHEST WITH CONTRAST:

 

COMPARISON: 

None.

 

HISTORY: 

Dizziness and syncope.  Low blood pressure.

 

TECHNIQUE: 

Multiple contiguous axial images were obtained in a CTA of the chest with contrast per pulmonary embo
lism protocol.  Three-D oblique MIP reformats and direct coronal reformats were performed.

 

FINDINGS: 

The pulmonary arteries are well opacified without filling defect to suggest pulmonary emboli.  The he
art is normal in size.   No hilar or mediastinal lymphadenopathy is seen.  There is a small hiatal he
rnia.

 

Diffuse increased interstitial lung markings are present.  These are more prominent along the periphe
ry and associated with traction bronchiectasis.  Peripheral honeycombing is also seen along the perip
phillip.  There is a calcified granuloma in the right lower lobe of the lungs.  No pneumothorax or pleur
al effusion is seen.

 

There are calcifications in the spleen likely from prior granulomatous disease.  The other visualized
 subdiaphragmatic structures are unremarkable.  Degenerative changes are seen in the spine.  The ches
t wall soft tissues are unremarkable.

 

IMPRESSION: 

1.  No evidence of pulmonary thromboembolism.

 

2.  Chronic interstitial lung disease.

 

POS: [facility]

## 2018-09-23 NOTE — RAD
PORTABLE AP CHEST XRAY:

 

DATE: 9/23/18.

 

HISTORY: 

Dizziness and loss of consciousness.

 

COMPARISON: 

2/8/18.

 

FINDINGS: 

Again noted are increased interstitial opacities throughout the lungs bilaterally overall similar to 
the prior study and probably related to chronic fibrotic interstitial lung changes.  There is a sugge
stion of a small left pleural effusion on today's exam.  Cardiac silhouette is magnified by projectio
n.  Vascular calcification is seen in the thoracic aorta.  There is osteopenia.  Degenerative changes
 are seen in the spine.  There is a suggestion of a mild compression fracture involving the T12 verte
bral body not well evaluated on this exam.  

 

IMPRESSION: 

1.  Chronic interstitial fibrotic lung changes.  Superimposed acute interstitial process would be dif
ficult to exclude given the degree of chronic lung changes.

 

2.  Small left pleural effusion.

 

3.  Suggestion of a compression fracture of T12 vertebral body of indeterminate age.

 

[facility]: [facility]

## 2018-09-23 NOTE — CT
CT OF THE BRAIN WITHOUT CONTRAST:

 

COMPARISON: 

2/1/18.

 

HISTORY: 

Fall in the shower with lightheadness.  Head trauma.

 

TECHNIQUE: 

Multiple contiguous axial images were obtained in a CT of the brain without contrast.

 

FINDINGS: 

There are scattered hypodensities in the subcortical and periventricular white matter, likely seconda
ry to small-vessel ischemic disease.  There is a remote area of encephalomalacia in the left parietal
 lobe.  There is no evidence of hydrocephalus, intracranial hemorrhage, or extraaxial fluid collectio
n.

 

The calvarium and overlying soft tissues are unremarkable.  The visualized paranasal sinuses and mast
oid air cells are well aerated.

 

IMPRESSION: 

No evidence of acute intracranial abnormality.

 

POS: [facility]

## 2018-10-17 ENCOUNTER — HOSPITAL ENCOUNTER (EMERGENCY)
Dept: HOSPITAL 92 - ERS | Age: 83
Discharge: HOME | End: 2018-10-17
Payer: MEDICARE
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
## 2018-10-17 DIAGNOSIS — I25.10: ICD-10-CM

## 2018-10-17 DIAGNOSIS — I25.2: ICD-10-CM

## 2018-10-17 DIAGNOSIS — S51.012A: Primary | ICD-10-CM

## 2018-10-17 DIAGNOSIS — F32.9: ICD-10-CM

## 2018-10-17 DIAGNOSIS — J98.4: ICD-10-CM

## 2018-10-17 LAB
ALBUMIN SERPL BCG-MCNC: 3.4 G/DL (ref 3.4–4.8)
ALP SERPL-CCNC: 60 U/L (ref 40–150)
ALT SERPL W P-5'-P-CCNC: 17 U/L (ref 8–55)
ANION GAP SERPL CALC-SCNC: 8 MMOL/L (ref 10–20)
AST SERPL-CCNC: 26 U/L (ref 5–34)
BASOPHILS # BLD AUTO: 0.1 THOU/UL (ref 0–0.2)
BASOPHILS NFR BLD AUTO: 1 % (ref 0–1)
BILIRUB SERPL-MCNC: 0.8 MG/DL (ref 0.2–1.2)
BUN SERPL-MCNC: 15 MG/DL (ref 8.4–25.7)
CALCIUM SERPL-MCNC: 8.9 MG/DL (ref 7.8–10.44)
CHLORIDE SERPL-SCNC: 110 MMOL/L (ref 98–107)
CO2 SERPL-SCNC: 29 MMOL/L (ref 23–31)
CREAT CL PREDICTED SERPL C-G-VRATE: 0 ML/MIN (ref 70–130)
EOSINOPHIL # BLD AUTO: 0.1 THOU/UL (ref 0–0.7)
EOSINOPHIL NFR BLD AUTO: 2.5 % (ref 0–10)
GLOBULIN SER CALC-MCNC: 3 G/DL (ref 2.4–3.5)
GLUCOSE SERPL-MCNC: 105 MG/DL (ref 83–110)
HGB BLD-MCNC: 13.6 G/DL (ref 14–18)
LYMPHOCYTES # BLD: 1 THOU/UL (ref 1.2–3.4)
LYMPHOCYTES NFR BLD AUTO: 18.2 % (ref 21–51)
MCH RBC QN AUTO: 33.2 PG (ref 27–31)
MCV RBC AUTO: 103 FL (ref 78–98)
MONOCYTES # BLD AUTO: 0.5 THOU/UL (ref 0.11–0.59)
MONOCYTES NFR BLD AUTO: 8.8 % (ref 0–10)
NEUTROPHILS # BLD AUTO: 3.7 THOU/UL (ref 1.4–6.5)
NEUTROPHILS NFR BLD AUTO: 69.6 % (ref 42–75)
PLATELET # BLD AUTO: 123 THOU/UL (ref 130–400)
POTASSIUM SERPL-SCNC: 4.3 MMOL/L (ref 3.5–5.1)
RBC # BLD AUTO: 4.1 MILL/UL (ref 4.7–6.1)
SODIUM SERPL-SCNC: 143 MMOL/L (ref 136–145)
TROPONIN I SERPL DL<=0.01 NG/ML-MCNC: (no result) NG/ML (ref ?–0.03)
WBC # BLD AUTO: 5.3 THOU/UL (ref 4.8–10.8)

## 2018-10-17 PROCEDURE — 70450 CT HEAD/BRAIN W/O DYE: CPT

## 2018-10-17 PROCEDURE — 84484 ASSAY OF TROPONIN QUANT: CPT

## 2018-10-17 PROCEDURE — 72125 CT NECK SPINE W/O DYE: CPT

## 2018-10-17 PROCEDURE — 80053 COMPREHEN METABOLIC PANEL: CPT

## 2018-10-17 PROCEDURE — 93005 ELECTROCARDIOGRAM TRACING: CPT

## 2018-10-17 PROCEDURE — 71045 X-RAY EXAM CHEST 1 VIEW: CPT

## 2018-10-17 PROCEDURE — 85025 COMPLETE CBC W/AUTO DIFF WBC: CPT

## 2018-10-17 PROCEDURE — 72131 CT LUMBAR SPINE W/O DYE: CPT

## 2018-10-17 PROCEDURE — 83880 ASSAY OF NATRIURETIC PEPTIDE: CPT

## 2018-10-17 NOTE — RAD
CHEST 1 VIEW:

 

Date:  10/17/18 

 

COMPARISON:  

09/23/18. 

 

HISTORY:  

Dyspnea. 

 

FINDINGS:

Chronic lung parenchymal changes. Stable cardiac silhouette. No pneumothorax. No osseous abnormalitie
s. 

 

IMPRESSION: 

Chronic lung parenchymal changes. 

 

 

POS: [facility]

## 2018-10-17 NOTE — CT
CT LUMBAR SPINE WITHOUT CONTRAST:

 

Date:  10/17/18 

 

INDICATION:

History of fall with back pain. 

 

COMPARISON:  

CTA of the thorax dated 09/23/18. 

 

FINDINGS:

There is stable chronic wedge compression abnormality of T12, stable to comparison dated 09/23/18. 

 

There are remote appearing inferior end plate compression abnormalities of L1 and L2. There is remote
 appearing superior end plate compression abnormality of L4. There is a remote appearing inferior end
 plate compression abnormality of L5. There is diffuse osteopenia. 

 

There are prominent vascular calcifications involving the abdominal aorta. 

 

There is a broad based disc bulge at L4-L5 with facet hypertrophy and loss of disc space height induc
ing at least moderate central canal narrowing and at least mild bilateral neural foraminal narrowing.
 

 

At L3-4, there is at least mild central canal narrowing and mild bilateral neural foraminal narrowing
. No appreciable definite central canal or neural foraminal narrowing is seen at the remaining lumbar
 intervertebral levels. 

 

IMPRESSION: 

1.  No definite acute fracture or subluxation is evident. 

2.  Multilevel remote-appearing compression abnormalities of the lumbar spine. 

3.  Diffuse osteopenia. 

4.  Moderate to severe multilevel spondylosis of the lumbar spine. 

5.  Some moderate central canal narrowing suspected at L4-5 with mild bilateral neural foraminal narr
owing. 

 

 

POS: [facility]

## 2018-10-17 NOTE — CT
BRAIN CT WITHOUT IV CONTRAST:

 

Date:  10/17/18 

 

HISTORY:  

87-year-old male with history of head injury secondary to trauma. Patient complains of low back pain 
after a fall. 

 

COMPARISON:  

09/23/18. 

 

FINDINGS:

Atrophy and chronic white matter ischemic change. No focal mass or midline shift. No intra or extra-a
xial hemorrhage. 

 

IMPRESSION: 

No acute intracranial process. No mass or bleed. Stable from prior study. 

 

 

 

POS: [facility]

## 2018-10-17 NOTE — CT
CT CERVICAL SPINE WITHOUT CONTRAST:

 

Indication: Fall with neck pain. 

 

Comparison: None. 

 

FINDINGS: 

No acute fracture or subluxation is evident. There is mild to moderate multilevel facet osteoarthrosi
s and disc degenerative disease. There is very slight anterior translation of C4 on C5 which is likel
y degenerative in nature. Osseous central canal appears relatively well preserved. The prevertebral s
oft tissues appear within normal limits. Craniocervical junction is normal appearing. 

 

IMPRESSION: 

No acute osseous abnormality. 

 

[facility]: [facility]

## 2018-10-28 ENCOUNTER — HOSPITAL ENCOUNTER (OUTPATIENT)
Dept: HOSPITAL 92 - ERS | Age: 83
Setting detail: OBSERVATION
LOS: 3 days | Discharge: HOME | End: 2018-10-31
Attending: HOSPITALIST | Admitting: HOSPITALIST
Payer: MEDICARE

## 2018-10-28 VITALS — BODY MASS INDEX: 22.2 KG/M2

## 2018-10-28 DIAGNOSIS — I11.0: ICD-10-CM

## 2018-10-28 DIAGNOSIS — I25.10: ICD-10-CM

## 2018-10-28 DIAGNOSIS — J18.9: Primary | ICD-10-CM

## 2018-10-28 DIAGNOSIS — Z79.899: ICD-10-CM

## 2018-10-28 DIAGNOSIS — Z88.0: ICD-10-CM

## 2018-10-28 DIAGNOSIS — I50.9: ICD-10-CM

## 2018-10-28 DIAGNOSIS — Z79.02: ICD-10-CM

## 2018-10-28 LAB
ALBUMIN SERPL BCG-MCNC: 3.7 G/DL (ref 3.4–4.8)
ALP SERPL-CCNC: 63 U/L (ref 40–150)
ALT SERPL W P-5'-P-CCNC: 27 U/L (ref 8–55)
ANION GAP SERPL CALC-SCNC: 12 MMOL/L (ref 10–20)
AST SERPL-CCNC: 33 U/L (ref 5–34)
BASOPHILS # BLD AUTO: 0 THOU/UL (ref 0–0.2)
BASOPHILS NFR BLD AUTO: 0.8 % (ref 0–1)
BILIRUB SERPL-MCNC: 0.8 MG/DL (ref 0.2–1.2)
BUN SERPL-MCNC: 9 MG/DL (ref 8.4–25.7)
CALCIUM SERPL-MCNC: 9.8 MG/DL (ref 7.8–10.44)
CHLORIDE SERPL-SCNC: 105 MMOL/L (ref 98–107)
CO2 SERPL-SCNC: 28 MMOL/L (ref 23–31)
CREAT CL PREDICTED SERPL C-G-VRATE: 0 ML/MIN (ref 70–130)
EOSINOPHIL # BLD AUTO: 0.2 THOU/UL (ref 0–0.7)
EOSINOPHIL NFR BLD AUTO: 4.2 % (ref 0–10)
GLOBULIN SER CALC-MCNC: 3.9 G/DL (ref 2.4–3.5)
GLUCOSE SERPL-MCNC: 107 MG/DL (ref 83–110)
HGB BLD-MCNC: 14.9 G/DL (ref 14–18)
LYMPHOCYTES # BLD: 1.3 THOU/UL (ref 1.2–3.4)
LYMPHOCYTES NFR BLD AUTO: 25.8 % (ref 21–51)
MAGNESIUM SERPL-MCNC: 1.8 MG/DL (ref 1.6–2.6)
MCH RBC QN AUTO: 33.6 PG (ref 27–31)
MCV RBC AUTO: 102 FL (ref 78–98)
MONOCYTES # BLD AUTO: 0.4 THOU/UL (ref 0.11–0.59)
MONOCYTES NFR BLD AUTO: 8.1 % (ref 0–10)
NEUTROPHILS # BLD AUTO: 3.1 THOU/UL (ref 1.4–6.5)
NEUTROPHILS NFR BLD AUTO: 61.1 % (ref 42–75)
PLATELET # BLD AUTO: 153 THOU/UL (ref 130–400)
POTASSIUM SERPL-SCNC: 4.1 MMOL/L (ref 3.5–5.1)
RBC # BLD AUTO: 4.44 MILL/UL (ref 4.7–6.1)
SODIUM SERPL-SCNC: 141 MMOL/L (ref 136–145)
SP GR UR STRIP: 1.01 (ref 1–1.04)
TROPONIN I SERPL DL<=0.01 NG/ML-MCNC: (no result) NG/ML (ref ?–0.03)
WBC # BLD AUTO: 5 THOU/UL (ref 4.8–10.8)

## 2018-10-28 PROCEDURE — 96366 THER/PROPH/DIAG IV INF ADDON: CPT

## 2018-10-28 PROCEDURE — 83735 ASSAY OF MAGNESIUM: CPT

## 2018-10-28 PROCEDURE — 71260 CT THORAX DX C+: CPT

## 2018-10-28 PROCEDURE — 93005 ELECTROCARDIOGRAM TRACING: CPT

## 2018-10-28 PROCEDURE — 87899 AGENT NOS ASSAY W/OPTIC: CPT

## 2018-10-28 PROCEDURE — 36415 COLL VENOUS BLD VENIPUNCTURE: CPT

## 2018-10-28 PROCEDURE — 99285 EMERGENCY DEPT VISIT HI MDM: CPT

## 2018-10-28 PROCEDURE — 96365 THER/PROPH/DIAG IV INF INIT: CPT

## 2018-10-28 PROCEDURE — 87040 BLOOD CULTURE FOR BACTERIA: CPT

## 2018-10-28 PROCEDURE — 71045 X-RAY EXAM CHEST 1 VIEW: CPT

## 2018-10-28 PROCEDURE — 87804 INFLUENZA ASSAY W/OPTIC: CPT

## 2018-10-28 PROCEDURE — 85025 COMPLETE CBC W/AUTO DIFF WBC: CPT

## 2018-10-28 PROCEDURE — G0378 HOSPITAL OBSERVATION PER HR: HCPCS

## 2018-10-28 PROCEDURE — 84484 ASSAY OF TROPONIN QUANT: CPT

## 2018-10-28 PROCEDURE — 80048 BASIC METABOLIC PNL TOTAL CA: CPT

## 2018-10-28 PROCEDURE — 96367 TX/PROPH/DG ADDL SEQ IV INF: CPT

## 2018-10-28 PROCEDURE — 84443 ASSAY THYROID STIM HORMONE: CPT

## 2018-10-28 PROCEDURE — 96368 THER/DIAG CONCURRENT INF: CPT

## 2018-10-28 PROCEDURE — 81003 URINALYSIS AUTO W/O SCOPE: CPT

## 2018-10-28 PROCEDURE — 80053 COMPREHEN METABOLIC PANEL: CPT

## 2018-10-28 NOTE — RAD
CHEST ONE VIEW:

 

History: Dyspnea. Dementia. 

 

Comparison: 10-17-18

 

FINDINGS: 

Cardiac silhouette is magnified by projection. Pulmonary vasculature is upper limits of normal. Wides
pread interstitial chronic changes are again demonstrated. More focal areas of parenchymal opacity no
w overlie the right mid chest at the superior segment right lower lobe and at each posterior lung bas
e. 

 

Mediastinum is midline. 

 

IMPRESSION: 

1. Developing multifocal infiltrates within each lower lobe. Clinical correlation regarding other sig
ns and symptoms of bilateral lower lobe pneumonitis is required. 

2. Other chronic type findings are otherwise stable.

 

POS: [facility]

## 2018-10-29 LAB
ALBUMIN SERPL BCG-MCNC: 3.3 G/DL (ref 3.4–4.8)
ALP SERPL-CCNC: 58 U/L (ref 40–150)
ALT SERPL W P-5'-P-CCNC: 25 U/L (ref 8–55)
ANION GAP SERPL CALC-SCNC: 9 MMOL/L (ref 10–20)
AST SERPL-CCNC: 29 U/L (ref 5–34)
BASOPHILS # BLD AUTO: 0 THOU/UL (ref 0–0.2)
BASOPHILS NFR BLD AUTO: 0.3 % (ref 0–1)
BILIRUB SERPL-MCNC: 0.7 MG/DL (ref 0.2–1.2)
BUN SERPL-MCNC: 9 MG/DL (ref 8.4–25.7)
CALCIUM SERPL-MCNC: 9.4 MG/DL (ref 7.8–10.44)
CHLORIDE SERPL-SCNC: 106 MMOL/L (ref 98–107)
CO2 SERPL-SCNC: 28 MMOL/L (ref 23–31)
CREAT CL PREDICTED SERPL C-G-VRATE: 43 ML/MIN (ref 70–130)
EOSINOPHIL # BLD AUTO: 0.2 THOU/UL (ref 0–0.7)
EOSINOPHIL NFR BLD AUTO: 4.4 % (ref 0–10)
GLOBULIN SER CALC-MCNC: 3.4 G/DL (ref 2.4–3.5)
GLUCOSE SERPL-MCNC: 111 MG/DL (ref 83–110)
HGB BLD-MCNC: 14.5 G/DL (ref 14–18)
LYMPHOCYTES # BLD: 1.4 THOU/UL (ref 1.2–3.4)
LYMPHOCYTES NFR BLD AUTO: 25.1 % (ref 21–51)
MCH RBC QN AUTO: 32.6 PG (ref 27–31)
MCV RBC AUTO: 103 FL (ref 78–98)
MONOCYTES # BLD AUTO: 0.5 THOU/UL (ref 0.11–0.59)
MONOCYTES NFR BLD AUTO: 9.1 % (ref 0–10)
NEUTROPHILS # BLD AUTO: 3.4 THOU/UL (ref 1.4–6.5)
NEUTROPHILS NFR BLD AUTO: 61.1 % (ref 42–75)
PLATELET # BLD AUTO: 155 THOU/UL (ref 130–400)
POTASSIUM SERPL-SCNC: 3.9 MMOL/L (ref 3.5–5.1)
RBC # BLD AUTO: 4.45 MILL/UL (ref 4.7–6.1)
S PNEUM AG UR QL: NEGATIVE
SODIUM SERPL-SCNC: 139 MMOL/L (ref 136–145)
WBC # BLD AUTO: 5.6 THOU/UL (ref 4.8–10.8)

## 2018-10-29 RX ADMIN — CEFTRIAXONE SCH MLS: 2 INJECTION, POWDER, FOR SOLUTION INTRAMUSCULAR; INTRAVENOUS at 16:27

## 2018-10-29 RX ADMIN — Medication SCH ML: at 21:00

## 2018-10-29 RX ADMIN — AZITHROMYCIN SCH MLS: 500 INJECTION, POWDER, LYOPHILIZED, FOR SOLUTION INTRAVENOUS at 14:45

## 2018-10-29 NOTE — HP
DATE OF ADMISSION:  10/28/2018

 

PRIMARY CARE PHYSICIAN:  Dr. Glover.

 

CHIEF COMPLAINT:  Shortness of breath, lethargic.

 

HISTORY OF PRESENT ILLNESS:  Mr. Hirsch is a pleasant 87-year-old male who presented to the ED with so
me complaints of trouble breathing and shortness of breath.  He also has some complaints of cough, so
re throat and weakness.  He does deny chest pain, abdominal pain, nausea, vomiting or diarrhea.  He s
tates symptoms have been going on for quite a while, but could not tell me timeframe.  Per patient, maria guadalupe simpson also has bouts of some increased forgetfulness, he does report a history of dementia.  Done in the 
ED, the chest x-ray did reveal some developing multifocal infiltrates within each lower lobe with chr
onic type findings otherwise.  Upon admission, a CT of chest with contrast was also obtained which sh
owed chronic diffuse interstitial lung disease, which was unchanged from prior exam, and subtle super
imposed airspace opacities in the lungs, which may represent multifocal pneumonia, which is most prom
inent lingula.  He was given a dose of IV vancomycin and meropenem in the ED and he states over the l
ast couple of hours, he feels much better with some improvement of his cough and shortness of breath.
  Currently, he does deny any chest pain, shortness of breath or abdominal pain and otherwise, he den
ies any other symptoms.  He states he wants to get better before going back because he is tired of fe
eling awful like he has been.

 

PAST MEDICAL HISTORY:  Significant for coronary artery disease, multiple cerebral ischemic event, dem
entia, osteoporosis, CHF, and restrictive lung disease.

 

PAST SURGICAL HISTORY:  Positive for cardiac stents x5, nephrectomy, on the right, rght shoulder surg
rafita, detached retinal repair on the left.

 

PSYCHIATRIC HISTORY:  Negative.

 

SOCIAL HISTORY:  Denies alcohol, drugs or smoking use.

 

FAMILY HISTORY:  Reviewed and not contributory.

 

HOME MEDICATIONS;

1.  Metoprolol 25 mg once daily.

2.  Furosemide 20 mg daily.

3.  Clopidogrel 75 mg daily.

4.  Potassium chloride 10 mEq daily.

5.  Dexilant 30 mg daily.

 

KNOWN ALLERGIES:  PENICILLIN.

 

REVIEW OF SYSTEMS:  Constitutional:  Denies weight loss or weight gain, sense of well being intact, d
enies weight loss or weight gain.  Does report some weakness and generalized feeling sick.  Eyes, den
ies eye pain, eye discharge or vision changes.  ENT:  Negative hearing loss, nasal congestion or sore
 throat.  Cardiovascular:  Denies chest pain, palpitation.  Respiratory:  Does report cough, shortnes
s of breath.  Gastrointestinal:  Did report some loss of appetite and abdominal pain in the ER; howev
er, does deny any pain for me.  Genitourinary:  Denies dysuria or hematuria.  Musculoskeletal:  Denie
s any back pain, fall, injury or neck pain.  Skin:  Denies rash or skin changes.  Neurologic:  Denies
 any weakness or headache.  Psychiatric:  Does report some depression and dementia.

 

PHYSICAL EXAMINATION:

VITAL SIGNS:  Temperature 98.2, pulse 75, blood pressure 158/70, respirations 22, O2 saturations 92% 
on room air.

HEENT:  Head, atraumatic, normocephalic.

EYES:  Pupils are equal, round, and reactive to light.  Extraocular muscles intact, no nystagmus.  EN
T:  Tympanic membranes are normal, pharynx is without any erythema.  Uvula midline, Moist mucous memb
ranes.

NECK:  Supple, trachea midline, no cervical adenopathy or tenderness noted.

CARDIOVASCULAR:  Positive S1, S2, no murmurs, regular rate and rhythm.

RESPIRATORY:  Clear to auscultation bilaterally, no wheezes or rhonchi.

ABDOMEN:  Soft, nontender, positive bowel sounds.  No distention.  No masses.  No guarding or rebound
 noted.

MUSCULOSKELETAL:  Strength 5+ bilaterally upper and lower extremities, moves all extremities.  Radial
 and pedal pulses 2+ bilaterally.

NEUROLOGIC:  Patient alert and oriented x3, speech, gait normal.

SKIN:  Warm and dry, no bruises or lesions noted.

PSYCHIATRIC:  Normal mood and affect.

 

ASSESSMENT AND PLAN:

1.  Community-acquired pneumonia, check urine legionella and strep pneumonia.  Start intravenous Roce
phin and azithromycin daily.  The patient does report an allergy to PENICILLIN; however, did state th
at he tolerated cephalosporins well in the past.  Start DuoNebs every 4 hours as needed for shortness
 of breath.  Start guaifenesin/dextromethorphan for cough.  Start benzonatate 100 mg p.o. q.4h. as ne
eded for cough.  Continue IV normal saline.  Blood culture showed no growth.  Continue O2 supplementa
tion to maintain O2 saturations above 92%.

2.  History of dementia, we will restart home dose of Remeron 15 mg p.o. at bedtime to prevent possib
le sundowners, will also consider 1:1 sitter if patient becomes agitated.

3.  Hypertension.  We will start the patient's home dose of metoprolol 12.5 mg twice daily.

4.  Congestive heart failure, we will start patient's furosemide 20 mg daily and monitor his symptoms
 closely.

5.  Further medical management pending.  The patient progress, we will monitor the patient closely an
d if no improvement over the next 24 hours, we will consult Pulmonology for further management and ev
aluation.

## 2018-10-29 NOTE — PDOC.EVN
Event Note





- Event Note


Event Note: 





Patient reviewed in collaboration with Ananda MAY 


Agree with documented physical, assessment, and plan


VSS, b/l faint crackles throughout





Additional changes to plan include: Sputum cx, anti-tussive regimen with 

guaifenisen/ dextromethrophan/ tesslon pearles. Check CT chest. 


D/w pt at bedside

## 2018-10-29 NOTE — CT
CT OF THE CHEST WITH CONTRAST:

 

History: Shortness of breath, pneumonia. 

 

Comparison: 9-23-18

 

Technique: Multiple contiguous axial images were obtained in a CT of the chest with contrast. Coronal
 reformats were performed. 

 

FINDINGS: 

Increased interstitial lung markings are seen in the lungs. There appear to be subtle superimposed ai
rspace opacities scattered throughout the lungs. These are most prominent in the lingula. No pleural 
effusion is seen. No pneumothorax is present. 

 

The heart is normal in size without focal cardiac abnormalities. Calcifications are seen in the coron
zenon arteries. No hilar or mediastinal lymphadenopathy are seen. 

 

Degenerative changes are seen in the spine. The chest wall soft tissues are unremarkable. The visuali
zed subdiaphragmatic structures are unremarkable. 

 

IMPRESSION: 

1. Chronic diffuse interstitial lung disease, unchanged. 

2. Subtle superimposed airspace opacities in the lungs may represent multifocal pneumonia. These are 
most prominent in the lingula. 

 

POS: [facility]

## 2018-10-30 LAB
ANION GAP SERPL CALC-SCNC: 11 MMOL/L (ref 10–20)
BASOPHILS # BLD AUTO: 0 THOU/UL (ref 0–0.2)
BASOPHILS NFR BLD AUTO: 0.4 % (ref 0–1)
BUN SERPL-MCNC: 11 MG/DL (ref 8.4–25.7)
CALCIUM SERPL-MCNC: 9 MG/DL (ref 7.8–10.44)
CHLORIDE SERPL-SCNC: 108 MMOL/L (ref 98–107)
CO2 SERPL-SCNC: 26 MMOL/L (ref 23–31)
CREAT CL PREDICTED SERPL C-G-VRATE: 48 ML/MIN (ref 70–130)
EOSINOPHIL # BLD AUTO: 0.3 THOU/UL (ref 0–0.7)
EOSINOPHIL NFR BLD AUTO: 5.4 % (ref 0–10)
GLUCOSE SERPL-MCNC: 110 MG/DL (ref 83–110)
HGB BLD-MCNC: 13.5 G/DL (ref 14–18)
LYMPHOCYTES # BLD: 1.2 THOU/UL (ref 1.2–3.4)
LYMPHOCYTES NFR BLD AUTO: 22.3 % (ref 21–51)
MCH RBC QN AUTO: 31.9 PG (ref 27–31)
MCV RBC AUTO: 102 FL (ref 78–98)
MONOCYTES # BLD AUTO: 0.5 THOU/UL (ref 0.11–0.59)
MONOCYTES NFR BLD AUTO: 9.2 % (ref 0–10)
NEUTROPHILS # BLD AUTO: 3.5 THOU/UL (ref 1.4–6.5)
NEUTROPHILS NFR BLD AUTO: 62.6 % (ref 42–75)
PLATELET # BLD AUTO: 150 THOU/UL (ref 130–400)
POTASSIUM SERPL-SCNC: 3.8 MMOL/L (ref 3.5–5.1)
RBC # BLD AUTO: 4.22 MILL/UL (ref 4.7–6.1)
SODIUM SERPL-SCNC: 141 MMOL/L (ref 136–145)
WBC # BLD AUTO: 5.6 THOU/UL (ref 4.8–10.8)

## 2018-10-30 RX ADMIN — CEFTRIAXONE SCH MLS: 2 INJECTION, POWDER, FOR SOLUTION INTRAMUSCULAR; INTRAVENOUS at 11:08

## 2018-10-30 RX ADMIN — AZITHROMYCIN SCH MLS: 500 INJECTION, POWDER, LYOPHILIZED, FOR SOLUTION INTRAVENOUS at 10:05

## 2018-10-30 RX ADMIN — GUAIFENESIN AND DEXTROMETHORPHAN PRN ML: 100; 10 SYRUP ORAL at 14:58

## 2018-10-30 RX ADMIN — Medication SCH ML: at 20:22

## 2018-10-30 RX ADMIN — Medication SCH ML: at 08:16

## 2018-10-30 NOTE — PDOC.PN
- Subjective


Encounter Start Date: 10/30/18


Encounter Start Time: 09:00





Patient lying in bed, he reports coughing up quite a bit of phlegm today. He 

states he slept well last night with no complaints. He reports some shortness 

of breath with cough. He denies chest pain, abdominal pain, no nausea vomiting 

or diarrhea.





- Objective


Resuscitation Status: 


 











Resuscitation Status           DNR:Do Not Resuscitate














MAR Reviewed: Yes


Vital Signs & Weight: 


 Vital Signs (12 hours)











  Temp Pulse Resp BP Pulse Ox


 


 10/30/18 15:39  97.8 F  78  18  138/65  94 L


 


 10/30/18 11:46  97.4 F L  67  18  118/58 L  95


 


 10/30/18 07:21  97.6 F  79  18  127/65  93 L








 Weight











Admit Weight                   133 lb 9.6 oz


 


Weight                         133 lb 9.6 oz














I&O: 


 











 10/29/18 10/30/18 10/31/18





 06:59 06:59 06:59


 


Intake Total 240 1550 1000


 


Balance 240 1550 1000











Result Diagrams: 


 10/30/18 04:55





 10/30/18 04:55


Radiology Reviewed by me: No


EKG Reviewed by me: No





Phys Exam





- Physical Examination


Constitutional: NAD


HEENT: PERRLA, moist MMs, sclera anicteric, oral pharynx no lesions


Neck: no nodes, no JVD, supple


Respiratory: no wheezing, no rales


Rhonchi noted bilaterally


Cardiovascular: RRR, no significant murmur, no rub


Gastrointestinal: soft, non-tender, no distention, positive bowel sounds


Musculoskeletal: no edema, pulses present


Neurological: non-focal, normal sensation, moves all 4 limbs


Lymphatic: no nodes


Psychiatric: normal affect, A&O x 3


Skin: no rash, normal turgor, cap refill <2 seconds





Dx/Plan


(1) Community acquired pneumonia


Code(s): J18.9 - PNEUMONIA, UNSPECIFIED ORGANISM   Status: Acute   





(2) General weakness


Code(s): R53.1 - WEAKNESS   Status: Acute   Comment:     





(3) HTN (hypertension)


Code(s): I10 - ESSENTIAL (PRIMARY) HYPERTENSION   Status: Chronic   


Qualifiers: 


   Hypertension type: essential hypertension   Qualified Code(s): I10 - 

Essential (primary) hypertension   


Comment: Resume home BP regimen, serial monitoring   





- Plan


cont current plan of care, continue antibiotics, DVT proph w/SCDs





* Continue IV azithromycin and ceftriaxone with plan to transition to oral 

antibiotics prior to discharge.


* Continue symptomatic treatment with robitussin and tessalon for cough, zofran 

as needed for nausea


* Continue home medications


* Recheck chest xray in the am, likely discharged tomorrow if symptoms 

improving.

## 2018-10-31 VITALS — DIASTOLIC BLOOD PRESSURE: 62 MMHG | SYSTOLIC BLOOD PRESSURE: 120 MMHG | TEMPERATURE: 97.6 F

## 2018-10-31 RX ADMIN — GUAIFENESIN AND DEXTROMETHORPHAN PRN ML: 100; 10 SYRUP ORAL at 08:35

## 2018-10-31 RX ADMIN — Medication SCH ML: at 08:25

## 2018-10-31 NOTE — DIS
DATE OF ADMISSION:  10/28/2018

 

DATE OF DISCHARGE:  10/31/2018

 

DISCHARGE DIAGNOSES:

1.  Community-acquired pneumonia, stable.

2.  Chronic interstitial lung disease, stable.

3.  History of dementia, stable.

4.  Hypertension.

5.  Congestive heart failure, stable.

 

CONSULTATIONS:  None.

 

PERTINENT LABORATORY AND DIAGNOSTIC FINDINGS:  WBC 5.6, RBC 4.22, hemoglobin 13.5, platelet count 150
.  Sodium 141, potassium 3.8, creatinine 0.93, TSH 2.09.  Urinalysis unremarkable.  Urine strep pneum
onia negative.  Urine legionella negative.  Chest x-ray revealed a developing multifocal infiltrates 
within each lower lobe.  CT of chest with contrast displayed chronic diffuse interstitial lung diseas
e which was unchanged and subtle superimposed airspace opacities in the lungs which may represent mul
tifocal pneumonia.

 

HOSPITAL COURSE:  Mr. Hirsch is a pleasant 87-year-old male who had presented to the emergency departm
ent with shortness of breath and cough and overall not feeling well.  He is a resident at [facility]. 
 He has a past medical history of dementia, hypertension, chronic interstitial lung disease.  In the 
ER, he underwent a chest x-ray which revealed developing multifocal pneumonia.  He was given one dose
 of IV meropenem and IV vancomycin, prior to IV antibiotics.  Blood cultures were obtained.  He was a
dmitted under observation for further management of his underlying condition.  He was continued on IV
 antibiotics which included IV azithromycin and ceftriaxone.  He had a history of an allergy to PENIC
ILLIN; however, he had tolerated IV Rocephin well without any complications.  Throughout the hospital
 course, his symptoms improved with the use of Robitussin and benzonatate for his cough.  His vital s
igns remained stable, along with a normal white count.  His cough and shortness of breath also improv
ed while continuing on antibiotics.  CT of the chest during hospital course did show chronic diffuse 
interstitial lung disease which was unchanged and subtle superimposed airspace opacities in the lungs
 which may represent multifocal pneumonia which were most prominent in the lingula.  He was then sherman
sitioned to oral antibiotics, which include azithromycin 250 mg and cefdinir 300 mg twice daily.  He 
had tolerated these well.  He was seen and examined prior to discharge and he had denied chest pain, 
shortness of breath or abdominal pain.  His cough was mild at this time.  However, due to his overall
 history of dementia and is progressing deconditioning.  His daughter and family felt that he would b
enefit from a skilled facility.  It was first thought that he would return back to [facility] with [facility] along with PT and OT; however, family has arranged for self-pay and for the patient to be di
scharged to skilled with [facility].  It also was recommended that he finish out the course for azithr
omycin and cefdinir.  He also was provided prescriptions for Tessalon Perles and guaifenesin/dextrome
thorphan.  Family and the patient did verbalize her understanding for the discharge plan and he was f
ound to be medically stable for discharge 10/31/2018.

 

DISCHARGE MEDICATIONS:

1.  Azithromycin 250 mg daily for 3 days.

2.  Benzonatate 100 mg oral every 4 hours as needed for cough.

3.  Cefdinir 300 mg twice daily.

4.  Guaifenesin/dextromethorphan 600/30 mg tablet twice daily.

5.  Potassium chloride 10 mEq daily.

6.  Furosemide 20 mg daily.

7.  Dorzolamide hydrochloride/timolol maleate 1 drop each eye daily.

8.  Clopidogrel 75 mg daily.

9.  Travoprost 1 drop each eye daily.

10.  Mirtazapine 15 mg oral at bedtime.

11.  Dexilant 60 mg oral at bedtime.

12.  Metoprolol tartrate 12.5 mg oral twice daily.

14.  Followup:  The patient is to follow up with his primary care physician, Dr. Elan Glover in 1-2 
weeks.

 

CONDITION ON DISCHARGE:  Stable.

 

ACTIVITY:  As tolerated.

 

DIET:  Heart healthy.

 

CODE STATUS:  FULL CODE.

 

DISPOSITION:  Family will self-pay skilled at [facility], 10/31/2018.

## 2019-01-04 ENCOUNTER — HOSPITAL ENCOUNTER (EMERGENCY)
Dept: HOSPITAL 92 - ERS | Age: 84
Discharge: NURSING, REHAB OR LONG-TERM CARE | End: 2019-01-04
Payer: MEDICARE

## 2019-01-04 DIAGNOSIS — M25.551: Primary | ICD-10-CM

## 2019-01-04 DIAGNOSIS — F32.9: ICD-10-CM

## 2019-01-04 DIAGNOSIS — I25.2: ICD-10-CM

## 2019-01-04 DIAGNOSIS — Z87.891: ICD-10-CM

## 2019-01-04 DIAGNOSIS — M81.0: ICD-10-CM
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
## 2019-01-04 LAB
ALBUMIN SERPL BCG-MCNC: 3.5 G/DL (ref 3.4–4.8)
ALP SERPL-CCNC: 79 U/L (ref 40–150)
ALT SERPL W P-5'-P-CCNC: 37 U/L (ref 8–55)
ANION GAP SERPL CALC-SCNC: 9 MMOL/L (ref 10–20)
AST SERPL-CCNC: 35 U/L (ref 5–34)
BASOPHILS # BLD AUTO: 0 THOU/UL (ref 0–0.2)
BASOPHILS NFR BLD AUTO: 0.1 % (ref 0–1)
BILIRUB SERPL-MCNC: 0.6 MG/DL (ref 0.2–1.2)
BUN SERPL-MCNC: 18 MG/DL (ref 8.4–25.7)
CALCIUM SERPL-MCNC: 10 MG/DL (ref 7.8–10.44)
CHLORIDE SERPL-SCNC: 103 MMOL/L (ref 98–107)
CO2 SERPL-SCNC: 32 MMOL/L (ref 23–31)
CREAT CL PREDICTED SERPL C-G-VRATE: 0 ML/MIN (ref 70–130)
EOSINOPHIL # BLD AUTO: 0.2 THOU/UL (ref 0–0.7)
EOSINOPHIL NFR BLD AUTO: 2.6 % (ref 0–10)
GLOBULIN SER CALC-MCNC: 3.8 G/DL (ref 2.4–3.5)
GLUCOSE SERPL-MCNC: 94 MG/DL (ref 83–110)
HGB BLD-MCNC: 15.1 G/DL (ref 14–18)
LYMPHOCYTES # BLD: 0.9 THOU/UL (ref 1.2–3.4)
LYMPHOCYTES NFR BLD AUTO: 12.5 % (ref 21–51)
MCH RBC QN AUTO: 33 PG (ref 27–31)
MCV RBC AUTO: 101 FL (ref 78–98)
MONOCYTES # BLD AUTO: 0.5 THOU/UL (ref 0.11–0.59)
MONOCYTES NFR BLD AUTO: 7.2 % (ref 0–10)
NEUTROPHILS # BLD AUTO: 5.4 THOU/UL (ref 1.4–6.5)
NEUTROPHILS NFR BLD AUTO: 77.5 % (ref 42–75)
PLATELET # BLD AUTO: 145 THOU/UL (ref 130–400)
POTASSIUM SERPL-SCNC: 4.2 MMOL/L (ref 3.5–5.1)
RBC # BLD AUTO: 4.58 MILL/UL (ref 4.7–6.1)
SODIUM SERPL-SCNC: 140 MMOL/L (ref 136–145)
WBC # BLD AUTO: 6.9 THOU/UL (ref 4.8–10.8)

## 2019-01-04 PROCEDURE — 84484 ASSAY OF TROPONIN QUANT: CPT

## 2019-01-04 PROCEDURE — 71045 X-RAY EXAM CHEST 1 VIEW: CPT

## 2019-01-04 PROCEDURE — 93005 ELECTROCARDIOGRAM TRACING: CPT

## 2019-01-04 PROCEDURE — 36415 COLL VENOUS BLD VENIPUNCTURE: CPT

## 2019-01-04 PROCEDURE — 80053 COMPREHEN METABOLIC PANEL: CPT

## 2019-01-04 PROCEDURE — 85025 COMPLETE CBC W/AUTO DIFF WBC: CPT

## 2019-01-04 NOTE — RAD
ONE VIEW CHEST:

1/4/19

 

COMPARISON:  

10/28/18.

 

INDICATION:

Fall, injury with pain.

 

FINDINGS:  

Compared to 10/28/18 exam, diffuse bilateral pleural/parenchymal opacities remain and are similar in 
appearance. Cardiac silhouette is stable in size. Bilateral perihilar vascular prominence remains.

 

IMPRESSION:  

Stable chest, with persistence of diffuse bilateral interstitial and pleural based densities.

 

[facility]: [facility]

## 2019-01-04 NOTE — RAD
TWO TO THREE VIEW RIGHT HIP SERIES:

1/4/19

 

INDICATION:

Emergency exam, right hip pain recent onset.

 

FINDINGS:  

Moderate osteoarthritis of the right hip is present. There is no acute fracture or dislocation. 

 

IMPRESSION:  

No acute osseous abnormality of the right hip. 

 

[facility]: [facility]

## 2019-03-03 ENCOUNTER — HOSPITAL ENCOUNTER (EMERGENCY)
Dept: HOSPITAL 92 - ERS | Age: 84
End: 2019-03-03
Payer: MEDICARE

## 2019-03-03 DIAGNOSIS — F32.9: ICD-10-CM

## 2019-03-03 DIAGNOSIS — R11.10: Primary | ICD-10-CM

## 2019-03-03 DIAGNOSIS — I25.10: ICD-10-CM

## 2019-03-03 DIAGNOSIS — I50.9: ICD-10-CM

## 2019-03-03 DIAGNOSIS — Z87.891: ICD-10-CM

## 2019-03-03 DIAGNOSIS — M81.0: ICD-10-CM

## 2019-03-03 PROCEDURE — 93005 ELECTROCARDIOGRAM TRACING: CPT

## 2019-03-03 PROCEDURE — 92950 HEART/LUNG RESUSCITATION CPR: CPT

## 2019-03-03 PROCEDURE — 96374 THER/PROPH/DIAG INJ IV PUSH: CPT
